# Patient Record
Sex: MALE | Race: WHITE | NOT HISPANIC OR LATINO
[De-identification: names, ages, dates, MRNs, and addresses within clinical notes are randomized per-mention and may not be internally consistent; named-entity substitution may affect disease eponyms.]

---

## 2021-09-18 ENCOUNTER — NON-APPOINTMENT (OUTPATIENT)
Age: 76
End: 2021-09-18

## 2021-09-22 PROBLEM — Z00.00 ENCOUNTER FOR PREVENTIVE HEALTH EXAMINATION: Status: ACTIVE | Noted: 2021-09-22

## 2021-09-25 ENCOUNTER — NON-APPOINTMENT (OUTPATIENT)
Age: 76
End: 2021-09-25

## 2021-09-27 ENCOUNTER — NON-APPOINTMENT (OUTPATIENT)
Age: 76
End: 2021-09-27

## 2021-09-27 ENCOUNTER — APPOINTMENT (OUTPATIENT)
Dept: VASCULAR SURGERY | Facility: CLINIC | Age: 76
End: 2021-09-27
Payer: MEDICARE

## 2021-09-27 VITALS
WEIGHT: 208 LBS | HEART RATE: 57 BPM | BODY MASS INDEX: 28.48 KG/M2 | HEIGHT: 71.5 IN | SYSTOLIC BLOOD PRESSURE: 176 MMHG | DIASTOLIC BLOOD PRESSURE: 74 MMHG

## 2021-09-27 DIAGNOSIS — I70.90 UNSPECIFIED ATHEROSCLEROSIS: ICD-10-CM

## 2021-09-27 DIAGNOSIS — Z83.3 FAMILY HISTORY OF DIABETES MELLITUS: ICD-10-CM

## 2021-09-27 DIAGNOSIS — Z86.59 PERSONAL HISTORY OF OTHER MENTAL AND BEHAVIORAL DISORDERS: ICD-10-CM

## 2021-09-27 DIAGNOSIS — Z86.79 PERSONAL HISTORY OF OTHER DISEASES OF THE CIRCULATORY SYSTEM: ICD-10-CM

## 2021-09-27 DIAGNOSIS — E78.5 HYPERLIPIDEMIA, UNSPECIFIED: ICD-10-CM

## 2021-09-27 DIAGNOSIS — Z78.9 OTHER SPECIFIED HEALTH STATUS: ICD-10-CM

## 2021-09-27 PROCEDURE — 99204 OFFICE O/P NEW MOD 45 MIN: CPT

## 2021-09-27 PROCEDURE — 93926 LOWER EXTREMITY STUDY: CPT

## 2021-09-27 PROCEDURE — 93880 EXTRACRANIAL BILAT STUDY: CPT

## 2021-09-28 PROBLEM — Z83.3 FAMILY HISTORY OF DIABETES MELLITUS: Status: ACTIVE | Noted: 2021-09-28

## 2021-09-28 PROBLEM — E78.5 DYSLIPIDEMIA: Status: RESOLVED | Noted: 2021-09-28 | Resolved: 2021-09-28

## 2021-09-28 PROBLEM — Z78.9 NON-SMOKER: Status: ACTIVE | Noted: 2021-09-28

## 2021-09-28 PROBLEM — I70.90 ARTERIAL CALCIFICATION: Status: ACTIVE | Noted: 2021-09-28

## 2021-09-28 PROBLEM — Z86.79 HISTORY OF HYPERTENSION: Status: RESOLVED | Noted: 2021-09-28 | Resolved: 2021-09-28

## 2021-09-28 PROBLEM — Z86.59 HISTORY OF ANXIETY: Status: RESOLVED | Noted: 2021-09-28 | Resolved: 2021-09-28

## 2021-09-28 RX ORDER — ZOLPIDEM TARTRATE 10 MG/1
10 TABLET, FILM COATED ORAL
Refills: 0 | Status: ACTIVE | COMMUNITY

## 2021-09-28 RX ORDER — CLONAZEPAM 0.5 MG/1
0.5 TABLET ORAL
Refills: 0 | Status: ACTIVE | COMMUNITY

## 2021-09-28 RX ORDER — EPLERENONE 25 MG/1
25 TABLET, COATED ORAL
Refills: 0 | Status: ACTIVE | COMMUNITY

## 2021-09-28 RX ORDER — ROSUVASTATIN CALCIUM 10 MG/1
10 TABLET, FILM COATED ORAL
Refills: 0 | Status: ACTIVE | COMMUNITY

## 2021-09-29 ENCOUNTER — TRANSCRIPTION ENCOUNTER (OUTPATIENT)
Age: 76
End: 2021-09-29

## 2021-09-30 NOTE — ASSESSMENT
[Arterial/Venous Disease] : arterial/venous disease [FreeTextEntry1] : 75 y/o smoker  M w/h/o of HTN, HLD who recently sustained an injury to his right Achilles tendon resulting in partial tear presents for initial evaluation. Patient states that he had XRays done due to his trauma and he was noted to have arterial calcification, therefore he was referred here.\par Patient with good palpable peripheral pulses, no claudication, rest pain or skin changes.\par Arterial duplex of RLE was done in the office demonstrating arterial medial calcification without significant stenosis\par Carotid duplex was done for screening demonstrating <50% stenosis bilaterally.\par Patient was reassured that he doesn't have any vascular conditions at this time.\par Once he recovers from his right leg trauma, he should stay active and continue to walk.\par F/u as needed.

## 2021-09-30 NOTE — ADDENDUM
[FreeTextEntry1] : \par I, Dr.Vicken Galindo, personally performed the evaluation and management (E/M) services for this new patient.  That E/M includes conducting the initial examination, assessing all conditions, and establishing the plan of care.  Today, my ACP, WALT Gibson, was here to observe my evaluation and management services for this patient to be followed going forward.\par \par \par

## 2021-09-30 NOTE — PROCEDURE
[FreeTextEntry1] : RLE arterial duplex: arterial medial calcification without significant stenosis\par Carotid duplex was done for screening demonstrating <50% stenosis bilaterally

## 2021-09-30 NOTE — HISTORY OF PRESENT ILLNESS
[FreeTextEntry1] : 75 y/o smoker  M w/h/o of HTN, HLD who recently sustained an injury to his right Achilles tendon resulting in partial tear presents for initial evaluation. Patient states that he had XRays done due to his trauma and he was noted to have arterial calcification, therefore he was referred here. Patient wears an orthopedic boot now, but prior to his trauma, he denies claudication, rest pain. He states that he is usually active and likes to walk. He recently was diagnosed with BP and was started on medication. He also underwent Nuclear stress test amd it was within normal limits. No hx of DVT, varicose veins, any neurologic deficits.